# Patient Record
Sex: MALE | Race: WHITE
[De-identification: names, ages, dates, MRNs, and addresses within clinical notes are randomized per-mention and may not be internally consistent; named-entity substitution may affect disease eponyms.]

---

## 2020-01-31 NOTE — OR
DATE:  01/31/2020

 

PROCEDURE:  Total colonoscopy and multiple pinch biopsies.

 

INSTRUMENT USED:  [id number] Olympus video colonoscope.

 

PREMEDICATIONS:  Fentanyl 100 mcg intravenous, Versed 3 mg intravenous.  Nasal

O2 cannula.

 

The procedure was done under pulse oximetry, BP recording, and cardiac monitor.

 

INDICATION:  The patient with rectal bleeding.  Colonoscopic examination is done

for detection of any polypoid lesions and removal, endoscopic hemostasis therapy

if needed.

 

DESCRIPTION OF PROCEDURE:  Initial rectal exam showed some mild diffuse

tenderness.  Rigid anoscopy showed small internal hemorrhoids without bleeding

from them.  The colonoscope was passed with ease up to the ileocecal area.

Photographs were taken of the normal-appearing cecum identified by landmarks of

appendiceal orifice and double-bulged ileocecal folds.  No bleeding was noted

from any of the visualized areas at the commencement of the examination.  No

stricture.  No vascular ectasia.  No large isolated ulcerations seen.  No polyp

or tumor mass identified.  Probing the proximal sides of folds and flexures

using adequate distention and clearing of the stool material, withdrawal of the

scope was made.  Patchy erythema of the mucosa was noted in the distal

descending colon, NBI views were obtained, photographs were taken, multiple

pinch biopsies were obtained and sent for histopathology.  No bleeding was noted

from any of the visualized areas at the completion of the examination.

 

IMPRESSION:  Internal hemorrhoids.

 

The patient tolerated the procedure well.

 

DD:  01/31/2020 07:42:07

DT:  01/31/2020 10:22:02

[facility]

Job #:  [id number]

## 2021-04-25 ENCOUNTER — HOSPITAL ENCOUNTER (EMERGENCY)
Dept: HOSPITAL 43 - DL.ED | Age: 36
Discharge: HOME | End: 2021-04-25
Payer: COMMERCIAL

## 2021-04-25 DIAGNOSIS — Z72.0: ICD-10-CM

## 2021-04-25 DIAGNOSIS — S20.212A: Primary | ICD-10-CM
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
## 2021-04-25 NOTE — EDM.PDOC
Scribed by Bernadette Azul 04/25/21 1524 for Brooklynn Moore NP





ED HPI GENERAL MEDICAL PROBLEM





- General


Chief Complaint: Chest Pain


Stated Complaint: FELL OFF SKATEBOARD HURTS TO BREATH,COUGH


Time Seen by Provider: 04/25/21 14:30


Source of Information: Reports: Patient, RN, RN Notes Reviewed


History Limitations: Reports: No Limitations





- History of Present Illness


INITIAL COMMENTS - FREE TEXT/NARRATIVE: 


Patient is a 35-year-old male with complaint of left rib pain. Patient states 

Thursday he was teaching his nephew how to skateboard and fell. Rates pain 8/10.

Difficulty with coughing, breathing and sneezing. Denies hitting head or getting

knocked out. He is a smoker. 


Onset Date: 04/22/21


Duration: Constant


Location: Reports: Chest


Quality: Reports: Ache


Severity: Moderate


Improves with: Reports: None


Worsens with: Reports: None


Associated Symptoms: Reports: No Other Symptoms


  ** Left Chest


Pain Score (Numeric/FACES): 8





- Related Data


                                    Allergies











Allergy/AdvReac Type Severity Reaction Status Date / Time


 


No Known Allergies Allergy   Verified 04/25/21 14:01











Home Meds: 


                                    Home Meds





Ibuprofen [Advil] 400 mg PO Q6H PRN 01/30/20 [History]











Past Medical History


HEENT History: Reports: Impaired Vision, Other (See Below)


Other HEENT History: CORRECTIVE VISION SURGERY AT 12 YEARS OLDTOOTH PAIN


Cardiovascular History: Reports: None


Respiratory History: Reports: None, Other (See Below)


Other Respiratory History: RECENT CONGESTION; UNDIAGNOSED SLEEP APNEA


Gastrointestinal History: Reports: Other (See Below)


Other Gastrointestinal History: BRIGHT RED BLEEDING WITH BOWEL MOVEMENTS


Genitourinary History: Reports: None


Musculoskeletal History: Reports: Back Pain, Chronic


Neurological History: Reports: None


Psychiatric History: Reports: None


Endocrine/Metabolic History: Reports: None


Hematologic History: Reports: None


Immunologic History: Reports: None


Oncologic (Cancer) History: Reports: None


Dermatologic History: Reports: None





- Infectious Disease History


Infectious Disease History: Reports: None





- Past Surgical History


Head Surgeries/Procedures: Reports: None


Cardiovascular Surgical History: Reports: None


Respiratory Surgical History: Reports: None


GI Surgical History: Reports: None


Male  Surgical History: Reports: None


Endocrine Surgical History: Reports: None


Neurological Surgical History: Reports: None


Musculoskeletal Surgical History: Reports: None


Oncologic Surgical History: Reports: None


Dermatological Surgical History: Reports: None





Social & Family History





- Family History


Family Medical History: No Pertinent Family History





- Tobacco Use


Tobacco Use Status *Q: Current Every Day Tobacco User


Years of Tobacco use: 16


Packs/Tins Daily: 1





- Caffeine Use


Caffeine Use: Reports: Energy Drinks





- Alcohol Use


Days Per Week of Alcohol Use: 7


Number of Drinks Per Day: 6


Total Drinks Per Week: 42





- Recreational Drug Use


Recreational Drug Use: No





ED ROS GENERAL





- Review of Systems


Review Of Systems: Comprehensive ROS is negative, except as noted in HPI.





ED EXAM, GENERAL





- Physical Exam


Exam: See Below


Exam Limited By: No Limitations


General Appearance: Alert, WD/WN, Moderate Distress


Eye Exam: Bilateral Eye: EOMI, Normal Inspection, PERRL


Ears: Normal External Exam, Normal Canal, Hearing Grossly Normal, Normal TMs


Nose: Normal Inspection, Normal Mucosa, No Blood


Throat/Mouth: Normal Inspection, Normal Lips, Normal Teeth, Normal Gums, Normal 

Oropharynx, Normal Voice, No Airway Compromise


Head: Atraumatic, Normocephalic


Neck: Normal Inspection, Supple, Non-Tender, Full Range of Motion


Respiratory/Chest: Crackles (bases bilaterally)


Cardiovascular: Normal Peripheral Pulses, Regular Rate, Rhythm, No Edema, No 

Gallop, No JVD, No Murmur, No Rub


GI/Abdominal: Normal Bowel Sounds, Soft, Non-Tender, No Organomegaly, No 

Distention, No Abnormal Bruit, No Mass


 (Male) Exam: Deferred


Rectal (Males) Exam: Deferred


Back Exam: Other (pain in left lateral ribs)


Extremities: Normal Inspection, Normal Range of Motion, Non-Tender, Normal 

Capillary Refill, No Pedal Edema


Neurological: Alert, Oriented, CN II-XII Intact, Normal Cognition, Normal Gait, 

Normal Reflexes, No Motor/Sensory Deficits


Psychiatric: Normal Affect, Normal Mood


Skin Exam: Warm, Dry, Intact, Normal Color, No Rash


Lymphatic: No Adenopathy





Course





- Vital Signs


Last Recorded V/S: 


                                Last Vital Signs











Temp  98.0 F   04/25/21 14:04


 


Pulse  110 H  04/25/21 14:04


 


Resp  18   04/25/21 14:04


 


BP  143/100 H  04/25/21 14:04


 


Pulse Ox  99   04/25/21 14:04














- Radiology Interpretation


Free Text/Narrative:: 


Left ribs xray with PA chest:


PROCEDURE INFORMATION:


Exam: XR Left Ribs with PA Chest


Exam date and time: 4/25/2021 2:05 PM


Age: 35 years old


Clinical indication: Other: Fall/pain; Additional info: Fall off skateboard, rib

 pain


TECHNIQUE:


Imaging protocol: XR Left ribs with PA chest.


Views: 3 views


COMPARISON:


No relevant prior studies available.


FINDINGS:


Lungs: The lungs are symmetric, well expanded and clear. No pulmonary contusion 

is identified.


Pleural spaces: There are no pleural effusions. There is no pneumothorax.


Heart/Mediastinum: The heart size is normal as are the mediastinal and hilar 

contours. The


pulmonary vessels are normal.


Bones/joints: No acute osseous pathology is identified. No acute left-sided rib 

fracture is


appreciated.


IMPRESSION:


No acute cardiopulmonary disease process identified.


Thank you for allowing us to participate in the care of your patient.


Dictated and Authenticated by: Deepthi Flores MD


04/25/2021 2:57 PM Central Time ([place] & [place])











See rad report








Departure





- Departure


Time of Disposition: 15:22


Disposition: Home, Self-Care 01


Condition: Good


Clinical Impression: 


Contusion


Qualifiers:


 Encounter type: initial encounter Contusion area: thoracic wall Front or back 

of thoracic wall: front Thoracic wall location detail: left Qualified Code(s): 

S20.212A - Contusion of left front wall of thorax, initial encounter








- Discharge Information


*PRESCRIPTION DRUG MONITORING PROGRAM REVIEWED*: No


*COPY OF PRESCRIPTION DRUG MONITORING REPORT IN PATIENT DIONICIO: No


Instructions:  Contusion, Easy-to-Read


Forms:  ED Department Discharge


Additional Instructions: 


Alternate Tylenol and ibuprofen for pain


Alternate heat and ice as tolerated


Lifting restriction of 10 pounds for the next 2 weeks


If no improvement follow-up with your primary care provider








Sepsis Event Note (ED)





- Evaluation


Sepsis Screening Result: No Definite Risk





- Focused Exam


Vital Signs: 


                                   Vital Signs











  Temp Pulse Resp BP Pulse Ox


 


 04/25/21 14:04  98.0 F  110 H  18  143/100 H  99














I have read and agree with the documentation that has been completed regarding 

this visit. By signing this record, I attest that the documentation was 

completed in my physical presence and is an accurate record of the encounter.

## 2021-04-25 NOTE — CR
PROCEDURE INFORMATION: 

Exam: XR Left Ribs with PA Chest 

Exam date and time: 4/25/2021 2:05 PM 

Age: 35 years old 

Clinical indication: Other: Fall/pain; Additional info: Fall off skateboard, 

rib pain 



TECHNIQUE: 

Imaging protocol: XR Left ribs with PA chest. 

Views: 3 views 



COMPARISON: 

No relevant prior studies available. 



FINDINGS: 

Lungs: The lungs are symmetric, well expanded and clear.  No pulmonary 

contusion is identified.

Pleural spaces: There are no pleural effusions. There is no pneumothorax. 

Heart/Mediastinum: The heart size is normal as are the mediastinal and hilar 

contours. The pulmonary vessels are normal. 

Bones/joints: No acute osseous pathology is identified. No acute left-sided rib 

fracture is appreciated. 



IMPRESSION: 

No acute cardiopulmonary disease process identified.